# Patient Record
Sex: FEMALE | Race: BLACK OR AFRICAN AMERICAN | NOT HISPANIC OR LATINO | URBAN - METROPOLITAN AREA
[De-identification: names, ages, dates, MRNs, and addresses within clinical notes are randomized per-mention and may not be internally consistent; named-entity substitution may affect disease eponyms.]

---

## 2019-03-23 ENCOUNTER — EMERGENCY (EMERGENCY)
Facility: HOSPITAL | Age: 34
LOS: 1 days | Discharge: HOME | End: 2019-03-23
Attending: EMERGENCY MEDICINE | Admitting: EMERGENCY MEDICINE
Payer: COMMERCIAL

## 2019-03-23 VITALS
TEMPERATURE: 98 F | HEART RATE: 82 BPM | WEIGHT: 164.91 LBS | OXYGEN SATURATION: 98 % | SYSTOLIC BLOOD PRESSURE: 136 MMHG | RESPIRATION RATE: 18 BRPM | DIASTOLIC BLOOD PRESSURE: 89 MMHG

## 2019-03-23 VITALS — HEART RATE: 76 BPM | SYSTOLIC BLOOD PRESSURE: 116 MMHG | DIASTOLIC BLOOD PRESSURE: 69 MMHG

## 2019-03-23 DIAGNOSIS — R20.0 ANESTHESIA OF SKIN: ICD-10-CM

## 2019-03-23 DIAGNOSIS — G51.0 BELL'S PALSY: ICD-10-CM

## 2019-03-23 DIAGNOSIS — Z79.52 LONG TERM (CURRENT) USE OF SYSTEMIC STEROIDS: ICD-10-CM

## 2019-03-23 DIAGNOSIS — Z79.899 OTHER LONG TERM (CURRENT) DRUG THERAPY: ICD-10-CM

## 2019-03-23 LAB — HCG UR QL: NEGATIVE — SIGNIFICANT CHANGE UP

## 2019-03-23 PROCEDURE — 99284 EMERGENCY DEPT VISIT MOD MDM: CPT | Mod: 25

## 2019-03-23 PROCEDURE — 93010 ELECTROCARDIOGRAM REPORT: CPT

## 2019-03-23 RX ORDER — VALACYCLOVIR 500 MG/1
1000 TABLET, FILM COATED ORAL ONCE
Qty: 0 | Refills: 0 | Status: COMPLETED | OUTPATIENT
Start: 2019-03-23 | End: 2019-03-23

## 2019-03-23 RX ORDER — VALACYCLOVIR 500 MG/1
1 TABLET, FILM COATED ORAL
Qty: 21 | Refills: 0 | OUTPATIENT
Start: 2019-03-23 | End: 2019-03-29

## 2019-03-23 RX ADMIN — VALACYCLOVIR 1000 MILLIGRAM(S): 500 TABLET, FILM COATED ORAL at 17:19

## 2019-03-23 RX ADMIN — Medication 60 MILLIGRAM(S): at 17:19

## 2019-03-23 NOTE — ED PROVIDER NOTE - CHPI ED SYMPTOMS NEG
no blurred vision/no dizziness/no weakness/no vomiting/no nausea/no change in level of consciousness

## 2019-03-23 NOTE — ED PROVIDER NOTE - OBJECTIVE STATEMENT
34 yo F w/ no pertinent PMHx c/o right sided facial numbness, right eye watering/twitching, change in taste on right side of tongue since this morning. Pt notes onset of symptoms while brushing her teeth. Pt notes recent cold with ear pain 2 weeks ago that resolved on its own. No dizziness, visual changes, changes in gait/speech/mental status, weakness, headache, chest pain, fever, chills.

## 2019-03-23 NOTE — ED PROVIDER NOTE - NSFOLLOWUPINSTRUCTIONS_ED_ALL_ED_FT
Please patch eye at night.  Use Lacri-Lube/ lubricated eye drops to prevent dryness.   Please follow up with your Primary Care MD.  Return to the ER or increased symptoms or other neurologic complaints.  If no one has called you with blood test results call me after 12noon on Tuesday or Wednesday [phone]. Dr. Bhatt.

## 2019-03-23 NOTE — ED PROVIDER NOTE - ENMT, MLM
Airway patent, Nasal mucosa clear. Mouth with normal mucosa. Throat has no vesicles, no oropharyngeal exudates and uvula is midline. HEAD: no facial droop, subjective change in taste on right side of tongue, no hyperacusis, hearing normal.

## 2019-03-23 NOTE — ED PROVIDER NOTE - CLINICAL SUMMARY MEDICAL DECISION MAKING FREE TEXT BOX
pt presents with facial numbness and weakness consistent early bell's palsy. will check HSV and lyme. valtrex and prednisone ordered; Lacrilube counselled on eye patch at night.

## 2019-03-23 NOTE — ED PROVIDER NOTE - EYES, MLM
Clear bilaterally, pupils equal, round and reactive to light. +decreased strength w/ raising eyebrow and closing right eye

## 2019-03-24 LAB
HSV1 IGG SER-ACNC: 26 INDEX — HIGH
HSV1 IGG SERPL QL IA: POSITIVE
HSV2 IGG FLD-ACNC: 0.19 INDEX — SIGNIFICANT CHANGE UP
HSV2 IGG SERPL QL IA: NEGATIVE — SIGNIFICANT CHANGE UP
LYME C6 AB IGG/IGM EIA REFLEX WESTERN BL: SIGNIFICANT CHANGE UP

## 2019-03-26 LAB
HSV1 AB FLD QL: SIGNIFICANT CHANGE UP TITER
HSV2 AB FLD-ACNC: SIGNIFICANT CHANGE UP TITER

## 2019-03-28 LAB
A PHAGOCYTOPH IGG TITR SER IF: SIGNIFICANT CHANGE UP TITER
B BURGDOR AB SER QL IA: NEGATIVE — SIGNIFICANT CHANGE UP
B MICROTI IGG TITR SER: SIGNIFICANT CHANGE UP TITER
E CHAFFEENSIS IGG TITR SER IF: SIGNIFICANT CHANGE UP TITER

## 2019-12-13 NOTE — ED ADULT TRIAGE NOTE - WEIGHT IN KG
Vit D level low at 12, given FHx of malabsorption disorders and obesity will start Vit D replacement at 5000 international units Daily. Mother updated over phone.  Will follow up in 6 weeks for repeat testing 74.8

## 2020-11-02 NOTE — ED PROVIDER NOTE - EYES [+], MLM
November 2, 2020      Wero Moore MD  [address]  [address]  [facility] [address]           [facility]  [address]  [facility] [address]  Phone: [phone]          Patient: Benito Winters   MR Number: [id number]   YOB: 1968   Date of Visit: 11/2/2020       Dear Dr. Wero Moore:    Thank you for referring Benito Winters to me for evaluation. Attached you will find relevant portions of my assessment and plan of care.    If you have questions, please do not hesitate to call me. I look forward to following Benito Winters along with you.    Sincerely,    Sriram Garcia MD    Enclosure  CC:  No Recipients    If you would like to receive this communication electronically, please contact [email or web address] or [phone] to request more information on [employer] Link access.    For providers and/or their staff who would like to refer a patient to [facility], please contact us through our one-stop-shop provider referral line, [facility], at [phone].    If you feel you have received this communication in error or would no longer like to receive these types of communications, please e-mail [email or web address]         
tearing

## 2022-12-19 ENCOUNTER — EMERGENCY (EMERGENCY)
Facility: HOSPITAL | Age: 37
LOS: 0 days | Discharge: HOME | End: 2022-12-19
Payer: OTHER MISCELLANEOUS

## 2022-12-19 VITALS
SYSTOLIC BLOOD PRESSURE: 122 MMHG | TEMPERATURE: 98 F | RESPIRATION RATE: 20 BRPM | HEART RATE: 93 BPM | DIASTOLIC BLOOD PRESSURE: 84 MMHG | OXYGEN SATURATION: 100 % | HEIGHT: 59 IN | WEIGHT: 154.98 LBS

## 2022-12-19 VITALS
HEART RATE: 79 BPM | DIASTOLIC BLOOD PRESSURE: 78 MMHG | RESPIRATION RATE: 19 BRPM | OXYGEN SATURATION: 99 % | SYSTOLIC BLOOD PRESSURE: 119 MMHG
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
## 2022-12-19 DIAGNOSIS — S89.91XA UNSPECIFIED INJURY OF RIGHT LOWER LEG, INITIAL ENCOUNTER: ICD-10-CM
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
## 2022-12-19 DIAGNOSIS — Z98.890 OTHER SPECIFIED POSTPROCEDURAL STATES: Chronic | ICD-10-CM

## 2022-12-19 DIAGNOSIS — Z98.891 HISTORY OF UTERINE SCAR FROM PREVIOUS SURGERY: Chronic | ICD-10-CM

## 2022-12-19 PROCEDURE — 99284 EMERGENCY DEPT VISIT MOD MDM: CPT

## 2022-12-19 PROCEDURE — 73564 X-RAY EXAM KNEE 4 OR MORE: CPT | Mod: 26,RT

## 2022-12-19 RX ORDER — IBUPROFEN 200 MG
600 TABLET ORAL ONCE
Refills: 0 | Status: COMPLETED | OUTPATIENT
Start: 2022-12-19 | End: 2022-12-19

## 2022-12-19 RX ADMIN — Medication 600 MILLIGRAM(S): at 13:17

## 2022-12-19 RX ADMIN — Medication 600 MILLIGRAM(S): at 12:22

## 2022-12-19 NOTE — ED PROVIDER NOTE - CLINICAL SUMMARY MEDICAL DECISION MAKING FREE TEXT BOX
38 y/o female with no PMH presents with right knee injury sustained today at work.   XR knee ordered r/o fracture or dislocation. Motrin ordered for pain.   Impression: knee sprain. 38 y/o female with no PMH presents with right knee injury sustained today at work.   XR knee ordered r/o fracture or dislocation. Motrin ordered for pain.   XR negative for fracture.    Pt ambulatory with steady gait.   Impression: knee sprain.    Pt can be discharged home and advised to RICE.   Pt advised to follow up with ortho if symptoms does not improve.

## 2022-12-19 NOTE — ED ADULT NURSE NOTE - OBJECTIVE STATEMENT
patient is A&Ox4. Breathing unlabored on RA. PMH/PSH of , bilateral breast reduction. patient complaining of fall today while going up the stairs. patient reports running to catch the train today and tripped on the stairs. patient complaining of worsening pain with movement. patient complaining of limping gait s/p fall. denies hitting head or LOC. able to wiggle toes, pedal pulses present, skin warm to touch. patient complaining of soreness to the site. denies any numbness/tingling, lacerations, n/v/d, bleeding. denies any other symptoms at this time. patient is A&Ox4. Breathing unlabored on RA. PMH/PSH of , bilateral breast reduction. patient complaining of fall today while going up the stairs. patient reports running to catch the train today and tripped on the stairs. patient complaining of worsening pain with movement. patient complaining of limping gait s/p fall. denies hitting head or LOC. able to wiggle toes, pedal pulses present, skin warm to touch. patient complaining of soreness to the site. denies any numbness/tingling, lacerations, n/v/d, bleeding. denies any other symptoms at this time. patient  on duty, accompanied by .

## 2022-12-19 NOTE — ED PROVIDER NOTE - PATIENT PORTAL LINK FT
You can access the FollowMyHealth Patient Portal offered by [facility] by registering at the following website: [email or web address]. By joining [employer]’s FollowMyHealth portal, you will also be able to view your health information using other applications (apps) compatible with our system.

## 2022-12-19 NOTE — ED PROVIDER NOTE - NSFOLLOWUPINSTRUCTIONS_ED_ALL_ED_FT
Apply ice and elevate. Take tylenol or motrin as needed for pain. Advance activity as tolerated.  Continue all previously prescribed medications as directed.  Follow up with your primary care physician in 48-72 hours- bring copies of your results.  Return to the ER for worsening or persistent symptoms, and/or ANY NEW OR CONCERNING SYMPTOMS. If you have issues obtaining follow up, please call: [phone] to obtain a doctor or specialist who takes your insurance in your area.  You may call [phone] to make an appointment with the internal medicine clinic.

## 2022-12-19 NOTE — ED PROVIDER NOTE - PHYSICAL EXAMINATION
GEN: Awake, alert, interactive, NAD.  HEAD AND NECK: NC/AT.    EYES:  Clear b/l.  CARDIAC: Regular rate, regular rhythm. No evident pedal edema.    RESP/CHEST: Normal respiratory effort with no use of accessory muscles or retractions. Clear throughout on auscultation.  EXTREMITIES: RLE: No deformity, (+) mild swelling to the right knee with tenderness,  (+) FROM of the knee with flexion and extension, (+) able to SLR, (-) calf tenderness. (+) FROM of the ankle.    SKIN: Warm, dry, intact normal color. No rash.   NEURO: AOx3, CN II-XII grossly intact, no focal deficits.   PSYCH: Appropriate mood and affect.

## 2022-12-19 NOTE — ED PROVIDER NOTE - OBJECTIVE STATEMENT
36 y/o female with no PMH presents with right knee injury sustained today at work. 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 Reports having mild pain and swelling to the right knee. Denies fall, numbness, tingling. LMP last week. Denies being pregnant. Didn't take anything for pain. Pt denies any other injuries.

## 2022-12-19 NOTE — ED ADULT NURSE NOTE - NS ED NURSE LEVEL OF CONSCIOUSNESS AFFECT
Calm/Appropriate
EOS calculated successfully. EOS Risk Factor: 0.5/1000 live births ([facility] national incidence); GA=40w2d; Temp=97.7; ROM=0.35; GBS='Positive'; Antibiotics='No antibiotics or any antibiotics < 2 hrs prior to birth'
